# Patient Record
Sex: MALE | Race: BLACK OR AFRICAN AMERICAN | NOT HISPANIC OR LATINO | URBAN - METROPOLITAN AREA
[De-identification: names, ages, dates, MRNs, and addresses within clinical notes are randomized per-mention and may not be internally consistent; named-entity substitution may affect disease eponyms.]

---

## 2020-01-18 ENCOUNTER — EMERGENCY (EMERGENCY)
Facility: HOSPITAL | Age: 30
LOS: 1 days | Discharge: HOME | End: 2020-01-18
Attending: PERSONAL EMERGENCY RESPONSE ATTENDANT | Admitting: PERSONAL EMERGENCY RESPONSE ATTENDANT
Payer: MEDICAID

## 2020-01-18 VITALS
DIASTOLIC BLOOD PRESSURE: 65 MMHG | RESPIRATION RATE: 16 BRPM | HEART RATE: 62 BPM | OXYGEN SATURATION: 100 % | SYSTOLIC BLOOD PRESSURE: 129 MMHG | TEMPERATURE: 98 F

## 2020-01-18 DIAGNOSIS — Z98.89 OTHER SPECIFIED POSTPROCEDURAL STATES: Chronic | ICD-10-CM

## 2020-01-18 PROCEDURE — 99283 EMERGENCY DEPT VISIT LOW MDM: CPT

## 2020-01-18 RX ORDER — VALACYCLOVIR 500 MG/1
1000 TABLET, FILM COATED ORAL ONCE
Refills: 0 | Status: COMPLETED | OUTPATIENT
Start: 2020-01-18 | End: 2020-01-18

## 2020-01-18 RX ORDER — VALACYCLOVIR 500 MG/1
1 TABLET, FILM COATED ORAL
Qty: 20 | Refills: 0
Start: 2020-01-18 | End: 2020-01-27

## 2020-01-18 RX ADMIN — VALACYCLOVIR 1000 MILLIGRAM(S): 500 TABLET, FILM COATED ORAL at 17:42

## 2020-01-18 NOTE — ED PROVIDER NOTE - PATIENT PORTAL LINK FT
You can access the FollowMyHealth Patient Portal offered by [facility] by registering at the following website: [email or web address]. By joining [employer]’s FollowMyHealth portal, you will also be able to view your health information using other applications (apps) compatible with our system.

## 2020-01-18 NOTE — ED PROVIDER NOTE - PHYSICAL EXAMINATION
Gen: WDWN, NAD  HEENT: EOMI, no nasal discharge, mucous membranes moist  CV: RRR, +S1/S2, no M/R/G  Resp: CTAB, no W/R/R  GI: Abdomen soft non-distended, NTTP, no masses  MSK: No open wounds, no bruising, no LE edema  Neuro: A&Ox4, following commands, moving all four extremities spontaneously  Psych: appropriate mood, denies AH, VH, SI  : circumcised, no penile discharge. Various erythematous non-pustulent healing ulcers noted on glans/shaft. No scrotal swelling/discoloration, testes descended b/l, smooth, no masses. epidymidis nontender, no inguinal hernias or lymph nodes.

## 2020-01-18 NOTE — ED PROVIDER NOTE - NS ED ROS FT
Gen: Denies fever, weight loss  CV: Denies chest pain, palpitations  Skin: Denies rash, erythema, color changes  Resp: Denies SOB, cough  Endo: Denies sensitivity to heat, cold, increased urination  GI: Denies constipation, nausea, vomiting  Msk: Denies back pain, LE swelling, extremity pain  : Denies dysuria, increased frequency + penile lesions   Neuro: Denies LOC, weakness, seizures  Psych: Denies hx of psych, hallucinations

## 2020-01-18 NOTE — ED PROVIDER NOTE - NSFOLLOWUPINSTRUCTIONS_ED_ALL_ED_FT
Please follow up with your primary care provider for further concerns you may have regarding your general health. Attached you will find your results from today's visit. Continue taking your medications as prescribed and keep your upcoming medical appointments. Please take the valtrex as prescribed for the next ten days and please follow up on your medical results.

## 2020-01-18 NOTE — ED ADULT TRIAGE NOTE - CHIEF COMPLAINT QUOTE
pt here for possible lesions on the back of his tongue that started yesterday. denies any trouble breathing or swallowing. states "bumps" are not painful. denies any fevers. recently seen at another ED for lesions on his penis

## 2020-01-18 NOTE — ED PROVIDER NOTE - CLINICAL SUMMARY MEDICAL DECISION MAKING FREE TEXT BOX
30M w/ recent penile lesions concerning for likely herpes - as pt has already been tested and is awaiting results will treat empirically given presence of lesions - he will follow-up w/ clinic results.

## 2020-01-18 NOTE — ED PROVIDER NOTE - ATTENDING CONTRIBUTION TO CARE
Attending MD Tinsley.  Agree with above.  Pt is a 29 yo male concerned because he has 'some lesions' on his tongue.  Found some lesions on his penis last week and seen at outside hospital for testing and told neg for syphilis/HIV/GC/chlam.  Told to be tested for herpes and had testing sent at an outside facility but doesn't have results yet. Presenting today because of lesions on his tongue.  Pt's tongue exam today c/w vallecular tastebuds.  These are non-tender.  Pt is concerned re: poss herpes.  Pt states that he had oral sex today with same female partner he's had.  Pt is not currently being txed for any STD.  Denies penile discharge but endorses vescicular lesions to penis that are most notable when he showers. Attending MD Tinsley.  Agree with above.  Pt is a 29 yo male concerned because he has 'some lesions' on his tongue.  Found some lesions on his penis last week and seen at outside hospital for testing and told neg for syphilis/HIV/GC/chlam.  Told to be tested for herpes and had testing sent at an outside facility but doesn't have results yet. Presenting today because of lesions on his tongue.  Pt's tongue exam today c/w vallecular tastebuds.  These are non-tender.  Pt is concerned re: poss herpes.  Pt states that he had oral sex today with same female partner he's had.  Pt is not currently being txed for any STD.  Denies penile discharge but endorses vescicular lesions to penis that are most notable when he showers.    Exam c/w herpetic infection. Pt to be txed with valcyclovir for suspected genital herpes outbreak. Pt advised to follow-up for results from his STD testing performed at outside facility.  Pt advised to return to ED for any acutely new/worsening sxs.  Stable for discharge to follow-up with PCP.  Avoid sexual intercourse until sxs resolved.  IF STD testing positive pt should alert all sexual partners to findings.  Pt verbalizes understanding of above return precautions and follow-up plan.

## 2020-01-18 NOTE — ED PROVIDER NOTE - OBJECTIVE STATEMENT
30M w/ PMH hidrenitis w/cc lesions on his tongue. Pt worked up for lesions on penis last week at OSH w/ negative STD testing for HIV/syphillis/GC (not empirically treated). Tested for genital herpes at outside clinic this week, awaiting results. Unprotected oral+ vaginal sex w/ mltpl partners (MSF) in past several months. States lesions on mouth are b/l, non-tender, noticed them while looking in mirror this am. (Upon further investigation it appears pt was referring to his circumvallate papillae). States penile lesions have been nontender, erythematous, w/ some prior clear discharge, more visible in the shower. Denies any f/c/n/v/d. No other oral lesions or ulcers, no sore throat, CP, abd pain. No hematuria, dysuria, genital discharge.

## 2020-01-18 NOTE — ED PROVIDER NOTE - ADDITIONAL NOTES AND INSTRUCTIONS:
Please excuse Mr. Pérez from work on 1/18 as he required emergency medical services.  Thank you in advance for your understanding in this matter.    Dr. Carli Tinsley

## 2020-04-09 ENCOUNTER — EMERGENCY (EMERGENCY)
Facility: HOSPITAL | Age: 30
LOS: 0 days | Discharge: HOME | End: 2020-04-09
Payer: COMMERCIAL

## 2020-04-09 VITALS
HEIGHT: 70 IN | TEMPERATURE: 99 F | WEIGHT: 186.07 LBS | OXYGEN SATURATION: 98 % | SYSTOLIC BLOOD PRESSURE: 133 MMHG | DIASTOLIC BLOOD PRESSURE: 87 MMHG | RESPIRATION RATE: 18 BRPM | HEART RATE: 90 BPM

## 2020-04-09 DIAGNOSIS — R07.89 OTHER CHEST PAIN: ICD-10-CM

## 2020-04-09 DIAGNOSIS — L73.2 HIDRADENITIS SUPPURATIVA: ICD-10-CM

## 2020-04-09 DIAGNOSIS — B34.9 VIRAL INFECTION, UNSPECIFIED: ICD-10-CM

## 2020-04-09 DIAGNOSIS — Z98.89 OTHER SPECIFIED POSTPROCEDURAL STATES: Chronic | ICD-10-CM

## 2020-04-09 LAB
FLU A RESULT: SIGNIFICANT CHANGE UP
FLU A RESULT: SIGNIFICANT CHANGE UP
FLUAV AG NPH QL: SIGNIFICANT CHANGE UP
FLUBV AG NPH QL: SIGNIFICANT CHANGE UP
RSV RESULT: SIGNIFICANT CHANGE UP
RSV RNA RESP QL NAA+PROBE: SIGNIFICANT CHANGE UP
SARS-COV-2 RNA SPEC QL NAA+PROBE: DETECTED

## 2020-04-09 PROCEDURE — 71045 X-RAY EXAM CHEST 1 VIEW: CPT | Mod: 26

## 2020-04-09 PROCEDURE — 99284 EMERGENCY DEPT VISIT MOD MDM: CPT

## 2020-04-09 NOTE — ED PROVIDER NOTE - OBJECTIVE STATEMENT
this is a 31 yo m c/o of chest tighness x 1 week. Denies n/v/d/sob. Pt family has covid. Pt grandma  yeterday

## 2020-04-09 NOTE — ED PROVIDER NOTE - CLINICAL SUMMARY MEDICAL DECISION MAKING FREE TEXT BOX
Pt is told the likelihood of having corona virus is high risk  base on symptoms. And to self quarantine for 14 days. Take tylenols prn for fever or pain, albuterol prn for chest tightness, zpack is use for clinical trail currently for treatement of covid19.  Discussed results and outcome of testing with the patient.  Patient advised to please follow up with their primary care doctor within the next 24-48 hours and return to the Emergency Department for worsening symptoms or any other concerns.  Patient advised that their doctor may call  to follow up on the specific results of the tests performed today in the emergency department.

## 2020-04-09 NOTE — ED ADULT TRIAGE NOTE - CHIEF COMPLAINT QUOTE
c/o chest discomfort intermittently since last week had cough which resolved denies fever denies shortness of breath at present

## 2020-04-09 NOTE — ED PROVIDER NOTE - PATIENT PORTAL LINK FT
You can access the FollowMyHealth Patient Portal offered by  by registering at the following website: [email or web address]. By joining [employer]’s FollowMyHealth portal, you will also be able to view your health information using other applications (apps) compatible with our system.

## 2020-04-09 NOTE — ED ADULT NURSE NOTE - OBJECTIVE STATEMENT
29 year old male presents with  chest discomfort since last week. He denies fever and cough resolved. The pain is intermittent not preset .

## 2020-04-09 NOTE — ED ADULT NURSE NOTE - NSIMPLEMENTINTERV_GEN_ALL_ED
Implemented All Universal Safety Interventions:  [place] to call system. Call bell, personal items and telephone within reach. Instruct patient to call for assistance. Room bathroom lighting operational. Non-slip footwear when patient is off stretcher. Physically safe environment: no spills, clutter or unnecessary equipment. Stretcher in lowest position, wheels locked, appropriate side rails in place.

## 2020-08-04 NOTE — ED PROVIDER NOTE - GASTROINTESTINAL, MLM
Addended by: RICHIE DE OLIVEIRA on: 8/4/2020 10:15 AM     Modules accepted: Orders    
Abdomen soft, non-tender, no guarding.

## 2021-06-10 ENCOUNTER — EMERGENCY (EMERGENCY)
Facility: HOSPITAL | Age: 31
LOS: 1 days | Discharge: HOME | End: 2021-06-10
Attending: EMERGENCY MEDICINE | Admitting: EMERGENCY MEDICINE
Payer: MEDICAID

## 2021-06-10 VITALS
SYSTOLIC BLOOD PRESSURE: 115 MMHG | HEIGHT: 70 IN | HEART RATE: 71 BPM | OXYGEN SATURATION: 100 % | DIASTOLIC BLOOD PRESSURE: 81 MMHG | TEMPERATURE: 98 F | RESPIRATION RATE: 16 BRPM

## 2021-06-10 DIAGNOSIS — Z98.89 OTHER SPECIFIED POSTPROCEDURAL STATES: Chronic | ICD-10-CM

## 2021-06-10 PROCEDURE — 99283 EMERGENCY DEPT VISIT LOW MDM: CPT

## 2021-06-10 RX ORDER — AZTREONAM 2 G
1 VIAL (EA) INJECTION
Qty: 14 | Refills: 0
Start: 2021-06-10 | End: 2021-06-16

## 2021-06-10 RX ORDER — KETOCONAZOLE 20 MG/G
1 AEROSOL, FOAM TOPICAL
Qty: 60 | Refills: 0
Start: 2021-06-10

## 2021-06-10 RX ADMIN — Medication 1 TABLET(S): at 18:29

## 2021-06-10 NOTE — ED PROVIDER NOTE - NSFOLLOWUPINSTRUCTIONS_ED_ALL_ED_FT
See your primary care doctor within 24-48 hours for a post hospital visit. Follow up with dermatology this week for further evaluation, our discharge center will call you to help arrange an appointment. Take Bactrim 1 tablet twice a day for 7 days. Apply Ketoconazole cream to the scalp, twice a day for 2 weeks.  Return to the ER for worsening symptoms, fever, worsening redness to the lesions on your calf, or any other concerns.

## 2021-06-10 NOTE — ED ADULT TRIAGE NOTE - CHIEF COMPLAINT QUOTE
pt c/o rash to right axillary area, swelling to legs x2 days. States "I think I'm allergic to my deodorant". Denies PMH. Denies CP, SOB, fever

## 2021-06-10 NOTE — ED PROVIDER NOTE - PATIENT PORTAL LINK FT
You can access the FollowMyHealth Patient Portal offered by [facility] by registering at the following website: [email or web address]. By joining [employer]s FollowMyHealth portal, you will also be able to view your health information using other applications (apps) compatible with our system.

## 2021-06-10 NOTE — ED PROVIDER NOTE - OBJECTIVE STATEMENT
30 y/o M no stated PMHx (hidradenitis per EMR) here c/o rash x 2 weeks. States he initially developed pustules to his R axilla, which ruptured and resolved on their own. Similarly developed pustules to the L axilla, then to the trunk/chest, most recently to the R posterior calf 2 days ago. States all pustules self resolve, but has never had similar sx in the past. Also c/o flaking and dryness to the scalp. Recently traveled to [place], states he was in the ocean. No pool/hot tube exposure. Denies fevers, chills, or any other complaints. No family members at home with similar rashes. No mucosal lesions.

## 2021-06-10 NOTE — ED PROVIDER NOTE - PHYSICAL EXAMINATION
Satelite lesions and skin flaking noted to scalp    R posterior calf with two, 2 x 1 cm areas of erythema w/ underlying induration, +tenderness and warmth to touch, no drainage, no red streaking, no fluctuance    Sites of previous pustules now scabbed over w/o underlying erythema or warmth

## 2021-06-10 NOTE — ED PROVIDER NOTE - CLINICAL SUMMARY MEDICAL DECISION MAKING FREE TEXT BOX
30 y/o M no stated PMHx (hidradenitis per EMR) here c/o rash x 2 weeks. Scalp appears to be 2/2 seborrheic dermatitis, will rx Ketoconazole. R posterior calf with 2 areas concerning for developing abscess. Concern for MRSA. Will treat w/ Bactrim BID x 7 days and arrange for derm follow up. Pt ok w/ plan.

## 2021-06-10 NOTE — ED PROVIDER NOTE - ATTENDING CONTRIBUTION TO CARE
I performed a history and physical exam of the patient and discussed their management with the PA. I reviewed the PA's note and agree with the documented findings and plan of care. I have edited as appropriate. My medical decision making and observations are found above.  pt p/w multiple pimple like purulent pustules in vague diffuse areas with spontaneous resolution, no constitutional signs of infection. also p/w what looks like fungal infection of scalp aka cradle cap. will d/c with anti fungals, mrsa coverage and derm f/u for biopsu.

## 2021-06-10 NOTE — ED PROVIDER NOTE - CARE PLAN
Principal Discharge DX:	Rash and nonspecific skin eruption  Secondary Diagnosis:	Seborrheic dermatitis of scalp

## 2021-06-13 ENCOUNTER — EMERGENCY (EMERGENCY)
Facility: HOSPITAL | Age: 31
LOS: 1 days | Discharge: HOME | End: 2021-06-13
Attending: STUDENT IN AN ORGANIZED HEALTH CARE EDUCATION/TRAINING PROGRAM | Admitting: STUDENT IN AN ORGANIZED HEALTH CARE EDUCATION/TRAINING PROGRAM
Payer: MEDICAID

## 2021-06-13 VITALS — OXYGEN SATURATION: 100 % | RESPIRATION RATE: 15 BRPM | TEMPERATURE: 99 F | HEIGHT: 70 IN | HEART RATE: 58 BPM

## 2021-06-13 VITALS
OXYGEN SATURATION: 100 % | DIASTOLIC BLOOD PRESSURE: 49 MMHG | HEART RATE: 52 BPM | SYSTOLIC BLOOD PRESSURE: 96 MMHG | TEMPERATURE: 98 F | RESPIRATION RATE: 16 BRPM

## 2021-06-13 DIAGNOSIS — Z98.89 OTHER SPECIFIED POSTPROCEDURAL STATES: Chronic | ICD-10-CM

## 2021-06-13 LAB
APPEARANCE UR: ABNORMAL
BACTERIA # UR AUTO: NEGATIVE — SIGNIFICANT CHANGE UP
BILIRUB UR-MCNC: NEGATIVE — SIGNIFICANT CHANGE UP
COLOR SPEC: YELLOW — SIGNIFICANT CHANGE UP
DIFF PNL FLD: NEGATIVE — SIGNIFICANT CHANGE UP
EPI CELLS # UR: 1 /HPF — SIGNIFICANT CHANGE UP (ref 0–5)
GLUCOSE UR QL: NEGATIVE — SIGNIFICANT CHANGE UP
HAV IGM SER-ACNC: SIGNIFICANT CHANGE UP
HBV CORE IGM SER-ACNC: SIGNIFICANT CHANGE UP
HBV SURFACE AG SER-ACNC: SIGNIFICANT CHANGE UP
HCV AB S/CO SERPL IA: 0.27 S/CO — SIGNIFICANT CHANGE UP (ref 0–0.99)
HCV AB SERPL-IMP: SIGNIFICANT CHANGE UP
HIV 1+2 AB+HIV1 P24 AG SERPL QL IA: SIGNIFICANT CHANGE UP
KETONES UR-MCNC: NEGATIVE — SIGNIFICANT CHANGE UP
LEUKOCYTE ESTERASE UR-ACNC: NEGATIVE — SIGNIFICANT CHANGE UP
NITRITE UR-MCNC: NEGATIVE — SIGNIFICANT CHANGE UP
PH UR: 8 — SIGNIFICANT CHANGE UP (ref 5–8)
PROT UR-MCNC: ABNORMAL
RBC CASTS # UR COMP ASSIST: 1 /HPF — SIGNIFICANT CHANGE UP (ref 0–4)
SP GR SPEC: 1.02 — SIGNIFICANT CHANGE UP (ref 1.01–1.02)
UROBILINOGEN FLD QL: ABNORMAL
WBC UR QL: 1 /HPF — SIGNIFICANT CHANGE UP (ref 0–5)

## 2021-06-13 PROCEDURE — 99284 EMERGENCY DEPT VISIT MOD MDM: CPT

## 2021-06-13 NOTE — ED PROVIDER NOTE - NSFOLLOWUPINSTRUCTIONS_ED_ALL_ED_FT
You will receive a call with the rest of your test results within 1-2 days. If you do not receive a call, please call us back.     Please continue to take your antibiotics take until every pill is gone or until directed to stop by a physician.    You were handed a referral to dermatology, if your rash persists please feel free to call and make an appointment    Please return to the Emergency Department should you experience any of the following:  - Continued or worsening shortness of breath  - Chest pain, Palpitations, Painful breathing  - Fever, unexplained weight loss, night sweats  - Blood in stool or in urine  - New weakness, fatigue, or fainting  - Any new concerning symptoms

## 2021-06-13 NOTE — ED ADULT NURSE NOTE - OBJECTIVE STATEMENT
Genny RN: Pt presents to RM 8 AO4 ambulatory complaining of rash. Pt was recently seen here in ED, prescribed anti fungal cream. Pt now coming in requesting HIV testing stating "I don't know if my partner is messing around." Pt endorsing "spotting" to body. few scabs visualized to BL legs. Rash remains under arms. Pt denies any new complaints. Endorsing abd pain with nausea, no v/d. Denies any chest pain sob or diff breathing. Amb with steady gait and appears in no obv distress, resp even unlabored.

## 2021-06-13 NOTE — ED ADULT NURSE REASSESSMENT NOTE - NS ED NURSE REASSESS COMMENT FT1
Patient received from break RN. Patient resting quietly in bed, breathing even and nonlabored. Denies any discomfort. No acute distress. Safety maintained. Patient stable upon exiting the room.

## 2021-06-13 NOTE — ED PROVIDER NOTE - CLINICAL SUMMARY MEDICAL DECISION MAKING FREE TEXT BOX
32yo M presenting with desire for STD testing following one episode of dysuria as well as penile discharge, exam remarkable for only follicular rash of pubis as well as flat macule of plantar surface of foot. Will screen for STD, rash unlikely related will give derm follow up.

## 2021-06-13 NOTE — ED ADULT TRIAGE NOTE - CHIEF COMPLAINT QUOTE
alert oriented no distress  c/o spreading generalized rash  abd pain diarrhea  frequent urination   was seen in ED 3 days ago for rash ( worsening since then)   no med hx

## 2021-06-13 NOTE — ED PROVIDER NOTE - OBJECTIVE STATEMENT
32yo M no pmh recently started on bactrim for RLE rash and ketoconazole for seborrheic dermatitis of scalp now presenting with concern of poss. std exposure with new dysuria and one episode white penile discharge. Pt denies arthralgias, fevers/chills. Sexually active with one female partner, no history of STDs, unknown partner exposure. Pt reports he wants to be tested for STDs.

## 2021-06-13 NOTE — ED PROVIDER NOTE - PATIENT PORTAL LINK FT
You can access the FollowMyHealth Patient Portal offered by [facility] by registering at the following website: [email or web address]. By joining [employer]’s FollowMyHealth portal, you will also be able to view your health information using other applications (apps) compatible with our system. You can access the FollowMyHealth Patient Portal offered by [facility] by registering at the following website: [email or web address]. By joining "[employer]’s FollowMyHealth portal, you will also be able to view your health information using other applications (apps) compatible with our system.

## 2021-06-13 NOTE — ED POST DISCHARGE NOTE - REASON FOR FOLLOW-UP
Culture Follow-up/Other Telephone follow up  request : Dr Goff requesting we follow up with patient's results and call patient at  . Patient allows us to leave results on VM. Presently awaiting HEp panel, GC/ Chlamydia and Syphilis.

## 2021-06-13 NOTE — ED PROVIDER NOTE - ATTENDING CONTRIBUTION TO CARE
32 yo m past medical history recently given bactrim for poss folliculitis and ketoconazole for seborrheic dermatitis presents with concern for poss exposure to STD. reports having penile discharge once yesterday but now denies any symptoms. no known hx of std, patient unsure about his current partner and has had unprotected sex in pass. exam as above. std testing. pt declining empiric tx. labs stable for dc

## 2021-06-13 NOTE — ED PROVIDER NOTE - PHYSICAL EXAMINATION
GENERAL: non-toxic appearing, in NAD  HEAD: atraumatic, normocephalic  EYES: vision grossly intact, no conjunctivitis or discharge  EARS: hearing grossly intact  NOSE: no nasal discharge, epistaxis   CARDIAC: RRR, normal S1S2,  no appreciable murmurs, no cyanosis, cap refill < 2 seconds  PULM: no respiratory distress, oxygen saturation on RA wnl, CTAB, no crackles, rales, rhonchi, or wheezing  GI: abdomen nondistended, soft, nontender, no guarding or rebound tenderness, no palpable masses  : no lesions or discharge noted on glans penis, small follicular rash of pubis  NEURO: awake and alert, follows commands, normal speech, PERRLA, EOMI, no focal motor or sensory deficits, normal gait  MSK: spine appears normal, no joint swelling or erythema, no gross deformities of extremities  EXT: no peripheral edema, calf tenderness, redness or swelling  SKIN: some vesicular rash on posterior calf with mild skin darkening, non-blanching dark macules on sole of foot   PSYCH: appropriate mood and affect

## 2021-06-13 NOTE — ED ADULT TRIAGE NOTE - ARRIVAL FROM
Enoxaparin/Lovenox - Potential for adverse drug reactions and interactions/Enoxaparin/Lovenox - Dietary Advice/Enoxaparin/Lovenox - Follow up monitoring/Enoxaparin/Lovenox - Compliance Home

## 2021-06-13 NOTE — ED PROVIDER NOTE - PLAN OF CARE
Fax received from [facility]-  Please review blood pressures, have been fluctuating.    Continue to monitor for now per Ann QUINTANILLA  Faxed back to facility    possible std exposure

## 2021-06-14 LAB
C TRACH RRNA SPEC QL NAA+PROBE: SIGNIFICANT CHANGE UP
N GONORRHOEA RRNA SPEC QL NAA+PROBE: SIGNIFICANT CHANGE UP
SPECIMEN SOURCE: SIGNIFICANT CHANGE UP
T PALLIDUM AB TITR SER: NEGATIVE — SIGNIFICANT CHANGE UP

## 2024-07-22 NOTE — ED ADULT TRIAGE NOTE - NS ED TRIAGE AVPU SCALE
Endoscopy recovery  Patient returned to baseline, vital signs stable (see vital sign flowsheet). Patient offered liquids and tolerated well. Respiratory status within defined limits. Abdomen soft not tender. Skin with in defined limits. Responsible party driving patient home was given the opportunity to ask questions. Patient discharged with documented belongings.    Alert-The patient is alert, awake and responds to voice. The patient is oriented to time, place, and person. The triage nurse is able to obtain subjective information.

## 2024-08-07 NOTE — ED ADULT TRIAGE NOTE - WEIGHT IN LBS
How Severe Are Your Bumps?: moderate Have Your Bumps Been Treated?: not been treated Is This A New Presentation, Or A Follow-Up?: Bumps 368

## 2024-12-06 NOTE — ED PROVIDER NOTE - DURATION
Patient is visible on the milieu, mostly withdrawn to self and hyper focused on med pass. Continuously approaches window to request medications. Patient denies all psych symptoms at this time and does not report any unmet needs. Compliant with nightly medication. Q 15 rounding maintained for safety.      today